# Patient Record
Sex: FEMALE | Race: WHITE | NOT HISPANIC OR LATINO | Employment: OTHER | URBAN - METROPOLITAN AREA
[De-identification: names, ages, dates, MRNs, and addresses within clinical notes are randomized per-mention and may not be internally consistent; named-entity substitution may affect disease eponyms.]

---

## 2019-05-30 ENCOUNTER — OFFICE VISIT (OUTPATIENT)
Dept: URGENT CARE | Facility: PHYSICIAN GROUP | Age: 64
End: 2019-05-30
Payer: COMMERCIAL

## 2019-05-30 VITALS
TEMPERATURE: 97.8 F | HEART RATE: 80 BPM | RESPIRATION RATE: 15 BRPM | BODY MASS INDEX: 20.49 KG/M2 | SYSTOLIC BLOOD PRESSURE: 118 MMHG | HEIGHT: 64 IN | OXYGEN SATURATION: 96 % | WEIGHT: 120 LBS | DIASTOLIC BLOOD PRESSURE: 72 MMHG

## 2019-05-30 DIAGNOSIS — R19.4 BOWEL HABIT CHANGES: ICD-10-CM

## 2019-05-30 DIAGNOSIS — R10.84 GENERALIZED ABDOMINAL PAIN: ICD-10-CM

## 2019-05-30 PROCEDURE — 99203 OFFICE O/P NEW LOW 30 MIN: CPT | Performed by: PHYSICIAN ASSISTANT

## 2019-05-30 RX ORDER — ESCITALOPRAM OXALATE 20 MG/1
10 TABLET ORAL DAILY
COMMUNITY

## 2019-05-31 ENCOUNTER — HOSPITAL ENCOUNTER (EMERGENCY)
Facility: MEDICAL CENTER | Age: 64
End: 2019-05-31
Attending: EMERGENCY MEDICINE
Payer: COMMERCIAL

## 2019-05-31 VITALS
HEIGHT: 64 IN | RESPIRATION RATE: 16 BRPM | WEIGHT: 118.17 LBS | TEMPERATURE: 98.6 F | BODY MASS INDEX: 20.17 KG/M2 | SYSTOLIC BLOOD PRESSURE: 134 MMHG | HEART RATE: 88 BPM | OXYGEN SATURATION: 94 % | DIASTOLIC BLOOD PRESSURE: 96 MMHG

## 2019-05-31 DIAGNOSIS — R19.7 NAUSEA VOMITING AND DIARRHEA: ICD-10-CM

## 2019-05-31 DIAGNOSIS — A04.72 CLOSTRIDIUM DIFFICILE DIARRHEA: ICD-10-CM

## 2019-05-31 DIAGNOSIS — R25.2 MUSCLE CRAMPS: ICD-10-CM

## 2019-05-31 DIAGNOSIS — E86.0 DEHYDRATION: ICD-10-CM

## 2019-05-31 DIAGNOSIS — R11.2 NAUSEA VOMITING AND DIARRHEA: ICD-10-CM

## 2019-05-31 LAB
ANION GAP SERPL CALC-SCNC: 13 MMOL/L (ref 0–11.9)
APPEARANCE UR: CLEAR
BACTERIA #/AREA URNS HPF: NEGATIVE /HPF
BASOPHILS # BLD AUTO: 0.3 % (ref 0–1.8)
BASOPHILS # BLD: 0.05 K/UL (ref 0–0.12)
BILIRUB UR QL STRIP.AUTO: NEGATIVE
BUN SERPL-MCNC: 13 MG/DL (ref 8–22)
C DIFF DNA SPEC QL NAA+PROBE: NEGATIVE
C DIFF TOX A+B STL QL IA: POSITIVE
C DIFF TOX GENS STL QL NAA+PROBE: NORMAL
CALCIUM SERPL-MCNC: 9.4 MG/DL (ref 8.5–10.5)
CHLORIDE SERPL-SCNC: 101 MMOL/L (ref 96–112)
CO2 SERPL-SCNC: 23 MMOL/L (ref 20–33)
COLOR UR: YELLOW
CREAT SERPL-MCNC: 0.66 MG/DL (ref 0.5–1.4)
EOSINOPHIL # BLD AUTO: 0.02 K/UL (ref 0–0.51)
EOSINOPHIL NFR BLD: 0.1 % (ref 0–6.9)
EPI CELLS #/AREA URNS HPF: NEGATIVE /HPF
ERYTHROCYTE [DISTWIDTH] IN BLOOD BY AUTOMATED COUNT: 40.4 FL (ref 35.9–50)
GLUCOSE SERPL-MCNC: 122 MG/DL (ref 65–99)
GLUCOSE UR STRIP.AUTO-MCNC: NEGATIVE MG/DL
HCT VFR BLD AUTO: 46.7 % (ref 37–47)
HGB BLD-MCNC: 15.5 G/DL (ref 12–16)
HYALINE CASTS #/AREA URNS LPF: ABNORMAL /LPF
IMM GRANULOCYTES # BLD AUTO: 0.06 K/UL (ref 0–0.11)
IMM GRANULOCYTES NFR BLD AUTO: 0.3 % (ref 0–0.9)
KETONES UR STRIP.AUTO-MCNC: 15 MG/DL
LEUKOCYTE ESTERASE UR QL STRIP.AUTO: NEGATIVE
LYMPHOCYTES # BLD AUTO: 1.48 K/UL (ref 1–4.8)
LYMPHOCYTES NFR BLD: 8.1 % (ref 22–41)
MCH RBC QN AUTO: 26.5 PG (ref 27–33)
MCHC RBC AUTO-ENTMCNC: 33.2 G/DL (ref 33.6–35)
MCV RBC AUTO: 79.8 FL (ref 81.4–97.8)
MICRO URNS: ABNORMAL
MONOCYTES # BLD AUTO: 1.05 K/UL (ref 0–0.85)
MONOCYTES NFR BLD AUTO: 5.7 % (ref 0–13.4)
NEUTROPHILS # BLD AUTO: 15.61 K/UL (ref 2–7.15)
NEUTROPHILS NFR BLD: 85.5 % (ref 44–72)
NITRITE UR QL STRIP.AUTO: NEGATIVE
NRBC # BLD AUTO: 0 K/UL
NRBC BLD-RTO: 0 /100 WBC
PH UR STRIP.AUTO: 6 [PH]
PLATELET # BLD AUTO: 260 K/UL (ref 164–446)
PMV BLD AUTO: 9.9 FL (ref 9–12.9)
POTASSIUM SERPL-SCNC: 3.6 MMOL/L (ref 3.6–5.5)
PROT UR QL STRIP: NEGATIVE MG/DL
RBC # BLD AUTO: 5.85 M/UL (ref 4.2–5.4)
RBC # URNS HPF: ABNORMAL /HPF
RBC UR QL AUTO: ABNORMAL
SODIUM SERPL-SCNC: 137 MMOL/L (ref 135–145)
SP GR UR STRIP.AUTO: 1.01
UROBILINOGEN UR STRIP.AUTO-MCNC: 0.2 MG/DL
WBC # BLD AUTO: 18.3 K/UL (ref 4.8–10.8)
WBC #/AREA URNS HPF: ABNORMAL /HPF
WBC STL QL MICRO: ABNORMAL

## 2019-05-31 PROCEDURE — 700111 HCHG RX REV CODE 636 W/ 250 OVERRIDE (IP): Performed by: EMERGENCY MEDICINE

## 2019-05-31 PROCEDURE — 87324 CLOSTRIDIUM AG IA: CPT

## 2019-05-31 PROCEDURE — 89055 LEUKOCYTE ASSESSMENT FECAL: CPT

## 2019-05-31 PROCEDURE — 81001 URINALYSIS AUTO W/SCOPE: CPT

## 2019-05-31 PROCEDURE — 80048 BASIC METABOLIC PNL TOTAL CA: CPT

## 2019-05-31 PROCEDURE — 99285 EMERGENCY DEPT VISIT HI MDM: CPT

## 2019-05-31 PROCEDURE — 96372 THER/PROPH/DIAG INJ SC/IM: CPT

## 2019-05-31 PROCEDURE — 96374 THER/PROPH/DIAG INJ IV PUSH: CPT

## 2019-05-31 PROCEDURE — 87899 AGENT NOS ASSAY W/OPTIC: CPT

## 2019-05-31 PROCEDURE — 85025 COMPLETE CBC W/AUTO DIFF WBC: CPT

## 2019-05-31 PROCEDURE — 87045 FECES CULTURE AEROBIC BACT: CPT

## 2019-05-31 PROCEDURE — 700105 HCHG RX REV CODE 258: Performed by: EMERGENCY MEDICINE

## 2019-05-31 PROCEDURE — 87493 C DIFF AMPLIFIED PROBE: CPT

## 2019-05-31 PROCEDURE — 700102 HCHG RX REV CODE 250 W/ 637 OVERRIDE(OP): Performed by: EMERGENCY MEDICINE

## 2019-05-31 PROCEDURE — 87046 STOOL CULTR AEROBIC BACT EA: CPT

## 2019-05-31 PROCEDURE — A9270 NON-COVERED ITEM OR SERVICE: HCPCS | Performed by: EMERGENCY MEDICINE

## 2019-05-31 RX ORDER — ONDANSETRON 2 MG/ML
4 INJECTION INTRAMUSCULAR; INTRAVENOUS ONCE
Status: COMPLETED | OUTPATIENT
Start: 2019-05-31 | End: 2019-05-31

## 2019-05-31 RX ORDER — DICYCLOMINE HCL 20 MG
20 TABLET ORAL EVERY 6 HOURS
Qty: 12 TAB | Refills: 0 | Status: SHIPPED | OUTPATIENT
Start: 2019-05-31

## 2019-05-31 RX ORDER — ONDANSETRON 4 MG/1
4 TABLET, ORALLY DISINTEGRATING ORAL EVERY 6 HOURS PRN
Qty: 10 TAB | Refills: 0 | Status: SHIPPED | OUTPATIENT
Start: 2019-05-31

## 2019-05-31 RX ORDER — SODIUM CHLORIDE 9 MG/ML
1000 INJECTION, SOLUTION INTRAVENOUS ONCE
Status: COMPLETED | OUTPATIENT
Start: 2019-05-31 | End: 2019-05-31

## 2019-05-31 RX ORDER — SODIUM CHLORIDE 9 MG/ML
2000 INJECTION, SOLUTION INTRAVENOUS ONCE
Status: COMPLETED | OUTPATIENT
Start: 2019-05-31 | End: 2019-05-31

## 2019-05-31 RX ORDER — ALENDRONATE SODIUM 70 MG/1
70 TABLET ORAL
COMMUNITY

## 2019-05-31 RX ORDER — AMOXICILLIN AND CLAVULANATE POTASSIUM 875; 125 MG/1; MG/1
1 TABLET, FILM COATED ORAL 2 TIMES DAILY
COMMUNITY
Start: 2019-05-13

## 2019-05-31 RX ORDER — DICYCLOMINE HYDROCHLORIDE 10 MG/ML
20 INJECTION INTRAMUSCULAR ONCE
Status: COMPLETED | OUTPATIENT
Start: 2019-05-31 | End: 2019-05-31

## 2019-05-31 RX ADMIN — SODIUM CHLORIDE 1000 ML: 9 INJECTION, SOLUTION INTRAVENOUS at 21:03

## 2019-05-31 RX ADMIN — SODIUM CHLORIDE 2000 ML: 9 INJECTION, SOLUTION INTRAVENOUS at 16:33

## 2019-05-31 RX ADMIN — DICYCLOMINE HYDROCHLORIDE 20 MG: 20 INJECTION, SOLUTION INTRAMUSCULAR at 16:33

## 2019-05-31 RX ADMIN — ONDANSETRON 4 MG: 2 INJECTION INTRAMUSCULAR; INTRAVENOUS at 16:33

## 2019-05-31 RX ADMIN — VANCOMYCIN HYDROCHLORIDE 250 MG: 10 INJECTION, POWDER, LYOPHILIZED, FOR SOLUTION INTRAVENOUS at 21:52

## 2019-05-31 ASSESSMENT — ENCOUNTER SYMPTOMS
MYALGIAS: 0
FEVER: 0
EYE REDNESS: 0
TINGLING: 0
ABDOMINAL PAIN: 1
ROS GI COMMENTS: 1
EYE DISCHARGE: 0
COUGH: 0
CHANGE IN BOWEL HABIT: 1
HEARTBURN: 0
CONSTIPATION: 0
CHILLS: 1
DIARRHEA: 1
VOMITING: 1
BLOOD IN STOOL: 0
WHEEZING: 0
HEADACHES: 0
NAUSEA: 1
DIZZINESS: 0
FATIGUE: 1
SORE THROAT: 0
NECK PAIN: 0

## 2019-05-31 NOTE — ED NOTES
"Pt brought back from [facility], ambulatory with steady gait.     Pt c/o n/v/d x 3 days. Pt states she had root canal approx. 2 weeks ago. Pt placed on Augmentin, states \"I had diarrhea the whole time I took the antibiotics.\" pt states after course of abx was done stool was more soft rather than watery. States last three days she has experienced diarrhea along with vomiting. States \"I cannot hold anything down.\"     Pt denies fevers. States lower abdominal cramping prior to needing to have BM. A/o x4, speaking in full sentences.   "

## 2019-05-31 NOTE — ED PROVIDER NOTES
ED Provider Note     Scribed for Nayana Bridges D.O. by Chloe Felton. 5/31/2019, 4:18 PM.     Primary care provider: Pcp Pt States None  Means of arrival: walk in          History obtained from: patient   History limited by: none     CHIEF COMPLAINT  Chief Complaint   Patient presents with   • Nausea/Vomiting/Diarrhea       \[facility] [place] [facility]\""  Dimple Olivo is a 63 y.o. female who presents to the emergency Department with nausea and vomiting which began 3 days ago. Patient reports associated diarrhea. Patient just completed a 10-day course Augmentin twice daily from 05/13/19-05/22/19 secondary to undergoing a root canal. Patient reports her symptoms began shortly after finishing the antibiotics. Her symptoms are exacerbated with eating and drinking. No other acute medical complaints or concerns.     REVIEW OF SYSTEMS  Pertinent positives include nausea, vomiting, diarrhea.   See HPI for further details. All other systems are negative.    PAST MEDICAL HISTORY  History reviewed. No pertinent past medical history.    FAMILY HISTORY  History reviewed. No pertinent family history.    SOCIAL HISTORY  Social History   Substance Use Topics   • Smoking status: Never Smoker   • Smokeless tobacco: Never Used   • Alcohol use None noted      History   Drug use: Unknown       SURGICAL HISTORY  History reviewed. No pertinent surgical history.    CURRENT MEDICATIONS  No current facility-administered medications for this encounter.     Current Outpatient Prescriptions:   •  alendronate (FOSAMAX) 70 MG Tab, Take 70 mg by mouth every Sunday., Disp: , Rfl:   •  amoxicillin-clavulanate (AUGMENTIN) 875-125 MG Tab, Take 1 Tab by mouth 2 times a day. 10-day course Starting 05/13/19 Ending 05/22/19., Disp: , Rfl:   •  escitalopram (LEXAPRO) 20 MG tablet, Take 10 mg by mouth every day., Disp: , Rfl:     ALLERGIES  Allergies   Allergen Reactions   • Keflex    • Keflin [Cephalothin]        PHYSICAL EXAM  VITAL SIGNS: /96   Pulse 100   Temp  "37 °C (98.6 °F) (Temporal)   Resp 18   Ht 1.626 m (5' 4\")   Wt 53.6 kg (118 lb 2.7 oz)   SpO2 97%   BMI 20.28 kg/m²     Constitutional: Patient is well developed, well nourished. Non-toxic appearing. Mild distress.   HENT: Normocephalic, atraumatic. Nose normal with no  drainage. Oropharynx dry without erythema or exudates. Dry mucous membranes.  Eyes: PERRL, EOMI, Conjunctiva without erythema or exudates.   Neck: Supple. Normal range of motion in flexion, extension and lateral rotation. No tenderness along the bony prominences or paraspinal muscles.  Lymphatic: No lymphadenopathy noted.   Cardiovascular: Tachycardic and Regular rhythm. No murmur.  Thorax & Lungs: Clear and equal breath sounds with good excursion. No respiratory distress, no rhonchi, wheezing or rales.  Abdomen: Bowel sounds normal in all four quadrants. Soft, mild generalized abdominal tenderness, no rebound , guarding, palpable masses.   Skin: Warm, Dry, No rashes. Increased skin turgor.   Back: No cervical, thoracic, or lumbosacral tenderness.  Extremities: Peripheral pulses 4/4 No edema, increased calf cramping.  Neurologic: Alert & oriented x 3, Normal motor function, Normal sensory function,  DTR's 4/4 bilaterally.  Psychiatric: Affect normal, Judgment normal, Mood normal.     DIAGNOSTICS/PROCEDURES    LABS  Results for orders placed or performed during the hospital encounter of 05/31/19   CBC WITH DIFFERENTIAL   Result Value Ref Range    WBC 18.3 (H) 4.8 - 10.8 K/uL    RBC 5.85 (H) 4.20 - 5.40 M/uL    Hemoglobin 15.5 12.0 - 16.0 g/dL    Hematocrit 46.7 37.0 - 47.0 %    MCV 79.8 (L) 81.4 - 97.8 fL    MCH 26.5 (L) 27.0 - 33.0 pg    MCHC 33.2 (L) 33.6 - 35.0 g/dL    RDW 40.4 35.9 - 50.0 fL    Platelet Count 260 164 - 446 K/uL    MPV 9.9 9.0 - 12.9 fL    Neutrophils-Polys 85.50 (H) 44.00 - 72.00 %    Lymphocytes 8.10 (L) 22.00 - 41.00 %    Monocytes 5.70 0.00 - 13.40 %    Eosinophils 0.10 0.00 - 6.90 %    Basophils 0.30 0.00 - 1.80 %    " Immature Granulocytes 0.30 0.00 - 0.90 %    Nucleated RBC 0.00 /100 WBC    Neutrophils (Absolute) 15.61 (H) 2.00 - 7.15 K/uL    Lymphs (Absolute) 1.48 1.00 - 4.80 K/uL    Monos (Absolute) 1.05 (H) 0.00 - 0.85 K/uL    Eos (Absolute) 0.02 0.00 - 0.51 K/uL    Baso (Absolute) 0.05 0.00 - 0.12 K/uL    Immature Granulocytes (abs) 0.06 0.00 - 0.11 K/uL    NRBC (Absolute) 0.00 K/uL   BASIC METABOLIC PANEL   Result Value Ref Range    Sodium 137 135 - 145 mmol/L    Potassium 3.6 3.6 - 5.5 mmol/L    Chloride 101 96 - 112 mmol/L    Co2 23 20 - 33 mmol/L    Glucose 122 (H) 65 - 99 mg/dL    Bun 13 8 - 22 mg/dL    Creatinine 0.66 0.50 - 1.40 mg/dL    Calcium 9.4 8.5 - 10.5 mg/dL    Anion Gap 13.0 (H) 0.0 - 11.9   URINALYSIS (UA)   Result Value Ref Range    Color Yellow     Character Clear     Specific Gravity 1.009 <1.035    Ph 6.0 5.0 - 8.0    Glucose Negative Negative mg/dL    Ketones 15 (A) Negative mg/dL    Protein Negative Negative mg/dL    Bilirubin Negative Negative    Urobilinogen, Urine 0.2 Negative    Nitrite Negative Negative    Leukocyte Esterase Negative Negative    Occult Blood Small (A) Negative    Micro Urine Req Microscopic    STOOL WBC'S   Result Value Ref Range    Stool WBC's Many (A) None seen   ESTIMATED GFR   Result Value Ref Range    GFR If African American >60 >60 mL/min/1.73 m 2    GFR If Non African American >60 >60 mL/min/1.73 m 2   URINE MICROSCOPIC (W/UA)   Result Value Ref Range    WBC 0-2 /hpf    RBC 2-5 (A) /hpf    Bacteria Negative None /hpf    Epithelial Cells Negative /hpf    Hyaline Cast 0-2 /lpf     Labs reviewed by me    COURSE & MEDICAL DECISION MAKING  Pertinent Labs & Imaging studies reviewed. (See chart for details)    4:18 PM - Patient seen and evaluated at bedside. Ordered for C Diff by PCR, stool WBCs, cultures stool, CBC, BMP, urinalysis to evaluate. Patient will be treated with Zofran 4 mg, Bentyl 20 mg for her symptoms. The patient will be resuscitated with 1L NS IV for signs of  clinical dehydration. Differential diagnoses include, but are not limited to, C Diff, viral gastroenteritis, colitis.     6:47 PM- Patient was reevaluated at bedside. She is feeling much better. There was a good response to IV fluids. Will PO challenge the patient.     8:30 PM- Reviewed the patient's lab results which are shown above. Her WBC was 18.3 and her stool was positive for C Diff. She will be treated with Vancomycin 250 mg.    8:51 PM- Patient rechecked at bedside. The patient was updated on diagnostic test results as seen above. The patient reports her legs are cramping and she will be given another L of NS IV. Once she receives the Vancomycin and NS IV, she will be stable for discharge. The patient will be discharged, status improved, with instructions regarding supportive care and with a prescription for Vancomycin. Instructions were given for follow-up. Discussed indications for seeking immediate medical attention. Patient was given the opportunity for questions. The patient understands and agrees.         HYDRATION: Based on the patient's presentation of Dehydration the patient was given IV fluids. IV Hydration was used because oral hydration was not adequate alone. Upon recheck following hydration, the patient was improved.    The patient will return for new or worsening symptoms and is stable at the time of discharge.    The patient is referred to a primary physician for blood pressure management, diabetic screening, and for all other preventative health concerns.    DISPOSITION:  Patient will be discharged home in stable condition.    FOLLOW UP:  No follow-up provider specified.    OUTPATIENT MEDICATIONS:  New Prescriptions    No medications on file     FINAL IMPRESSION  C. difficile diarrhea  Nausea with vomiting  Generalized abdominal cramping     I, Chloe Felton (Luis A), am scribing for, and in the presence of, Nayana Bridges D.O..    Electronically signed by: Chloe Mtz),  5/31/2019    INayana D.O. personally performed the services described in this documentation, as scribed by Chloe Felton in my presence, and it is both accurate and complete.     The note accurately reflects work and decisions made by me.  Nayana Bridges  5/31/2019  9:59 PM

## 2019-05-31 NOTE — ED NOTES
Med Rec Updated and Complete per Pt at bedside with permission to do so in front of family/visitor  Allergies Reviewed    Pt reports completing a 10-day course Augmentin 875-125mg twice daily from 05/13/19 Ending 05/22/19.

## 2019-05-31 NOTE — ED TRIAGE NOTES
Pt to triage , pt states she had a root canal, was put on antibx  For 10 days, finished the antibx last Thursday , had developed diarrhea at the time, now c/o severe vomiting , and still some diarrhea

## 2019-05-31 NOTE — PROGRESS NOTES
Subjective:      Dimple Olivo is a 63 y.o. female who presents with GI Problem (x 4 days stomach pains, mucus in stool )          Patient is a pleasant 63-year-old female who presents to urgent care with intermittent loose stools since recently on Augmentin.  She completed the antibiotic on the 22nd as she was undergoing a dental procedure.  She does report since stopping the antibiotic she is developed crampy abdominal pain with nausea and vomiting taking 2 days ago.  She denies any vomiting today although does report recent nausea this morning.  She does report that she is tolerating fluids without difficulty at this time.  Of note she became more concerned as she feels the last few days she is been only passing mucus and has been having worsening abdominal pain.  Of note she denies hx of diverticulitis, however does feel similar episode last year. She also denies hx of C. Diff.     GI Problem   This is a new problem. Episode onset: 4-5 days ago. The problem occurs constantly. The problem has been waxing and waning. Associated symptoms include abdominal pain, a change in bowel habit, chills, fatigue, nausea and vomiting. Pertinent negatives include no congestion, coughing, fever, headaches, myalgias, neck pain, rash, sore throat or urinary symptoms. The symptoms are aggravated by eating and drinking. She has tried nothing for the symptoms.       Review of Systems   Constitutional: Positive for chills, fatigue and malaise/fatigue. Negative for fever.   HENT: Negative for congestion, ear discharge and sore throat.    Eyes: Negative for discharge and redness.   Respiratory: Negative for cough and wheezing.    Gastrointestinal: Positive for abdominal pain, change in bowel habit, diarrhea, nausea and vomiting. Negative for blood in stool, constipation, heartburn and melena.        1   Genitourinary: Negative for dysuria and urgency.   Musculoskeletal: Negative for myalgias and neck pain.   Skin: Negative for  "itching and rash.   Neurological: Negative for dizziness, tingling and headaches.          Objective:     /72   Pulse 80   Temp 36.6 °C (97.8 °F) (Temporal)   Resp 15   Ht 1.626 m (5' 4\")   Wt 54.4 kg (120 lb)   SpO2 96%   BMI 20.60 kg/m²    PMH:  has no past medical history on file.  MEDS:   Current Outpatient Prescriptions:   •  escitalopram (LEXAPRO) 20 MG tablet, Take 10 mg by mouth every day., Disp: , Rfl:   •  alendronate (FOSAMAX) 70 MG Tab, Take 70 mg by mouth every Sunday., Disp: , Rfl:   •  amoxicillin-clavulanate (AUGMENTIN) 875-125 MG Tab, Take 1 Tab by mouth 2 times a day. 10-day course Starting 05/13/19 Ending 05/22/19., Disp: , Rfl:   ALLERGIES:   Allergies   Allergen Reactions   • Keflex    • Keflin [Cephalothin]      SURGHX: No past surgical history on file.  SOCHX:  reports that she has never smoked. She has never used smokeless tobacco.  FH: Family history was reviewed, no pertinent findings to report    Physical Exam   Constitutional: She is oriented to person, place, and time. She appears well-developed and well-nourished.   HENT:   Head: Normocephalic and atraumatic.   Right Ear: External ear normal.   Left Ear: External ear normal.   Nose: Nose normal.   Mouth/Throat: Oropharynx is clear and moist. No oropharyngeal exudate.   Eyes: Pupils are equal, round, and reactive to light. EOM are normal.   Neck: Normal range of motion. Neck supple.   Cardiovascular: Normal rate.    No murmur heard.  Pulmonary/Chest: Effort normal and breath sounds normal. No respiratory distress.   Abdominal: Soft. She exhibits no mass. There is no tenderness. There is no rebound and no guarding. No hernia.   Mild tenderness diffusely.   Hyperactive bowel sounds. Neg. Heel tap.    Lymphadenopathy:     She has no cervical adenopathy.   Neurological: She is alert and oriented to person, place, and time.   Skin: Skin is warm. No rash noted. No pallor.   Good skin turgor.      Psychiatric: She has a normal mood " and affect. Her behavior is normal.   Vitals reviewed.              Assessment/Plan:     1. Generalized abdominal pain  - CT-ABDOMEN-PELVIS W/O; Future    2. Bowel habit changes    Pt. With notable pain the last few days without a significantly bowel movement in 3-4 days. Labs at this time of evening are no longer available. However will attempt to have imaging along with stool studies as patient was recently on Augmentin last week for dental concern.   Pt. Is tolerating Po fluids at this time.   Start BRAT diet and continue with fluids- she denies any anti-emetic meds in the clinic today.   Patient given precautionary s/sx that mandate immediate follow up and evaluation in the ED. Advised of risks of not doing so.    DDX, Supportive care, and indications for immediate follow-up discussed with patient.    Instructed to return to clinic or nearest emergency department if we are not available for any change in condition, further concerns, or worsening of symptoms.    The patient demonstrated a good understanding and agreed with the treatment plan.  Please note that this dictation was created using voice recognition software. I have made every reasonable attempt to correct obvious errors, but I expect that there are errors of grammar and possibly content that I did not discover before finalizing the note.    Pt. Is agreeable to have imaging conducted tomorrow on 5/31.   5/  Recheck on patient as she had not scheduling for imaging- pt. Currently having evaluation in the ED.

## 2019-06-01 LAB
E COLI SXT1+2 STL IA: NORMAL
SIGNIFICANT IND 70042: NORMAL
SITE SITE: NORMAL
SOURCE SOURCE: NORMAL

## 2019-06-01 RX ORDER — VANCOMYCIN HYDROCHLORIDE 125 MG/1
125 CAPSULE ORAL 4 TIMES DAILY
Qty: 40 CAP | Refills: 0 | Status: SHIPPED | OUTPATIENT
Start: 2019-06-01 | End: 2019-06-11

## 2019-06-01 NOTE — ED NOTES
Pt discharged home. Explained discharge instructions. Questions and comments addressed. Pt verbalized understanding of instructions. Wristband removed. Pt advised to follow-up with PCP or return to ED for any new or worsening of symptoms. Pt is ambulating well and steady on feet. VS stable.     PIV removed and dressing applied  Pt verbalized understanding of all 3 medication instructions. + probiotic instructions and contact precautions

## 2019-06-01 NOTE — ED NOTES
"Ed from [facility] contacted for lab results, states results \"should be done here in a minute or two\"  "

## 2019-06-01 NOTE — DISCHARGE PLANNING
Discussed with Dr Hurley and new Rx written (Vanco caps) and faxed to [facility] in [place] ([phone]) with confirmation.     Rx scanned to media tab.

## 2019-06-01 NOTE — DISCHARGE INSTRUCTIONS
Increase clear liquids for the next 24 hours and advance as tolerated with the brat diet (bananas, rice, applesauce, toast)  Rest as much as possible  Drink Gatorade for electrolytes  Take the vancomycin until completely gone  Try taking acidophilus and plain white yogurt as well  Follow-up with your primary care doctor within 1 week for recheck and return if any problems or worsening.

## 2019-06-01 NOTE — ED NOTES
"ED Positive Culture Follow-up/Notification Note:    Date: 6/1/19     Patient seen in the ED on 5/31/2019 for nausea/vomiting/diarrhea.   1. Clostridium difficile diarrhea    2. Nausea vomiting and diarrhea    3. Dehydration    4. Muscle cramps       Patient received vancomycin 250mg oral soln once before discharge.  Discharge Medication List as of 5/31/2019  9:57 PM      START taking these medications    Details   Vancomycin HCl (VANCOMYCIN 50 MG/ML) 50 mg/mL Solution Take 2.5 mL by mouth every 6 hours., Disp-100 Quantity Sufficient, R-0, Print Rx Paper      ondansetron (ZOFRAN ODT) 4 MG TABLET DISPERSIBLE Take 1 Tab by mouth every 6 hours as needed., Disp-10 Tab, R-0, Print Rx Paper      dicyclomine (BENTYL) 20 MG Tab Take 1 Tab by mouth every 6 hours., Disp-12 Tab, R-0, Print Rx Paper             Allergies: Keflex and Keflin [cephalothin]     Vitals:    05/31/19 1900 05/31/19 2000 05/31/19 2101 05/31/19 2130   BP:       Pulse: 84  88 88   Resp: 18 20 16 16   Temp:       TempSrc:       SpO2: 96%  95% 94%   Weight:       Height:           Final cultures:   Results     Procedure Component Value Units Date/Time    C Diff Toxin [[id number]]  (Abnormal) Collected:  05/31/19 1741    Order Status:  Completed Updated:  05/31/19 2220     C.Diff Toxin A&B Positive (A)     Comment: TOXIN POSITIVE  Toxin detected by EIA; C. difficile detected by PCR.  If clinically correlated, treatment indicated per guidelines.  Test of cure is not recommended.         Narrative:       ER tel.  05/31/2019, 22:20, Called [phone].  Does this patient have risk factors for C-diff?->Yes  C-Diff Risk Factors->antibiotic exposure  Has patient taken stool softeners or laxatives in the last 5  days?->No  Indication for CDiff by PCR?->Greater than/equal to 3 stools  in 24 hr & \"takes shape of container\"    C Diff by PCR rflx Toxin [[id number]] Collected:  05/31/19 1741    Order Status:  Completed Specimen:  Urine from Stool Updated:  05/31/19 2217     C " "Diff by PCR See Toxin     027-NAP1-BI Presumptive Negative     Comment: Presumptive 027/NAP1/BI target DNA sequences are NOT DETECTED.       Narrative:       Does this patient have risk factors for C-diff?->Yes  C-Diff Risk Factors->antibiotic exposure  Has patient taken stool softeners or laxatives in the last 5  days?->No  Indication for CDiff by PCR?->Greater than/equal to 3 stools  in 24 hr & \"takes shape of container\"    URINALYSIS (UA) [[id number]]  (Abnormal) Collected:  05/31/19 1741    Order Status:  Completed Specimen:  Urine Updated:  05/31/19 1809     Color Yellow     Character Clear     Specific Gravity 1.009     Ph 6.0     Glucose Negative mg/dL      Ketones 15 (A) mg/dL      Protein Negative mg/dL      Bilirubin Negative     Urobilinogen, Urine 0.2     Nitrite Negative     Leukocyte Esterase Negative     Occult Blood Small (A)     Micro Urine Req Microscopic    CULTURE STOOL [[id number]] Collected:  05/31/19 1741    Order Status:  Completed Specimen:  Urine from Stool Updated:  05/31/19 1748          Plan:   Appropriate antibiotic therapy prescribed. No changes required based upon culture result. I called and discussed the positive culture result and encouraged compliance with prescribed antibiotics along with strict return precautions for worsening (e.g. Fever, increased stooling, increased abdominal pain). Patient stated she had to have her prescription changed to oral capsules and would be picking them up in [place]. She was feeling better, but her stomach has started to hurt a little. She will get the capsules from [place] and acknowledge compliance and the return precautions. She said she received excellent care here and thanked me for my call. She also took my number in case she had any problems obtaining her antibiotics.      Juliet Frank, PharmD    "

## 2019-06-01 NOTE — DISCHARGE PLANNING
Pt called stating she cannot get her Vancomycin Liquid filled in [place] and would like another Rx. Will d/w ERP and call pt back.

## 2019-06-03 LAB
BACTERIA STL CULT: NORMAL
E COLI SXT1+2 STL IA: NORMAL
SIGNIFICANT IND 70042: NORMAL
SITE SITE: NORMAL
SOURCE SOURCE: NORMAL

## 2019-06-16 ENCOUNTER — HOSPITAL ENCOUNTER (EMERGENCY)
Facility: MEDICAL CENTER | Age: 64
End: 2019-06-16
Attending: EMERGENCY MEDICINE
Payer: COMMERCIAL

## 2019-06-16 VITALS
OXYGEN SATURATION: 96 % | DIASTOLIC BLOOD PRESSURE: 88 MMHG | BODY MASS INDEX: 19.68 KG/M2 | HEART RATE: 86 BPM | HEIGHT: 64 IN | RESPIRATION RATE: 16 BRPM | SYSTOLIC BLOOD PRESSURE: 122 MMHG | WEIGHT: 115.3 LBS | TEMPERATURE: 97 F

## 2019-06-16 DIAGNOSIS — R11.2 NAUSEA AND VOMITING, INTRACTABILITY OF VOMITING NOT SPECIFIED, UNSPECIFIED VOMITING TYPE: ICD-10-CM

## 2019-06-16 DIAGNOSIS — R10.84 GENERALIZED ABDOMINAL PAIN: ICD-10-CM

## 2019-06-16 DIAGNOSIS — A04.72 C. DIFFICILE DIARRHEA: ICD-10-CM

## 2019-06-16 LAB
ALBUMIN SERPL BCP-MCNC: 4.8 G/DL (ref 3.2–4.9)
ALBUMIN/GLOB SERPL: 1.5 G/DL
ALP SERPL-CCNC: 86 U/L (ref 30–99)
ALT SERPL-CCNC: 16 U/L (ref 2–50)
ANION GAP SERPL CALC-SCNC: 11 MMOL/L (ref 0–11.9)
APPEARANCE UR: CLEAR
AST SERPL-CCNC: 14 U/L (ref 12–45)
BACTERIA #/AREA URNS HPF: NEGATIVE /HPF
BASOPHILS # BLD AUTO: 0.2 % (ref 0–1.8)
BASOPHILS # BLD: 0.04 K/UL (ref 0–0.12)
BILIRUB SERPL-MCNC: 0.7 MG/DL (ref 0.1–1.5)
BILIRUB UR QL STRIP.AUTO: NEGATIVE
BUN SERPL-MCNC: 18 MG/DL (ref 8–22)
CALCIUM SERPL-MCNC: 9.7 MG/DL (ref 8.5–10.5)
CHLORIDE SERPL-SCNC: 99 MMOL/L (ref 96–112)
CO2 SERPL-SCNC: 27 MMOL/L (ref 20–33)
COLOR UR: YELLOW
CREAT SERPL-MCNC: 0.75 MG/DL (ref 0.5–1.4)
EOSINOPHIL # BLD AUTO: 0.01 K/UL (ref 0–0.51)
EOSINOPHIL NFR BLD: 0.1 % (ref 0–6.9)
EPI CELLS #/AREA URNS HPF: NEGATIVE /HPF
ERYTHROCYTE [DISTWIDTH] IN BLOOD BY AUTOMATED COUNT: 40.9 FL (ref 35.9–50)
GLOBULIN SER CALC-MCNC: 3.3 G/DL (ref 1.9–3.5)
GLUCOSE SERPL-MCNC: 122 MG/DL (ref 65–99)
GLUCOSE UR STRIP.AUTO-MCNC: NEGATIVE MG/DL
HCT VFR BLD AUTO: 48.2 % (ref 37–47)
HGB BLD-MCNC: 14.9 G/DL (ref 12–16)
HYALINE CASTS #/AREA URNS LPF: ABNORMAL /LPF
IMM GRANULOCYTES # BLD AUTO: 0.06 K/UL (ref 0–0.11)
IMM GRANULOCYTES NFR BLD AUTO: 0.4 % (ref 0–0.9)
KETONES UR STRIP.AUTO-MCNC: 15 MG/DL
LEUKOCYTE ESTERASE UR QL STRIP.AUTO: ABNORMAL
LIPASE SERPL-CCNC: 8 U/L (ref 11–82)
LYMPHOCYTES # BLD AUTO: 2.82 K/UL (ref 1–4.8)
LYMPHOCYTES NFR BLD: 17.2 % (ref 22–41)
MCH RBC QN AUTO: 25.1 PG (ref 27–33)
MCHC RBC AUTO-ENTMCNC: 30.9 G/DL (ref 33.6–35)
MCV RBC AUTO: 81.1 FL (ref 81.4–97.8)
MICRO URNS: ABNORMAL
MONOCYTES # BLD AUTO: 1.22 K/UL (ref 0–0.85)
MONOCYTES NFR BLD AUTO: 7.4 % (ref 0–13.4)
NEUTROPHILS # BLD AUTO: 12.23 K/UL (ref 2–7.15)
NEUTROPHILS NFR BLD: 74.7 % (ref 44–72)
NITRITE UR QL STRIP.AUTO: NEGATIVE
NRBC # BLD AUTO: 0 K/UL
NRBC BLD-RTO: 0 /100 WBC
PH UR STRIP.AUTO: 7 [PH]
PLATELET # BLD AUTO: 295 K/UL (ref 164–446)
PMV BLD AUTO: 10 FL (ref 9–12.9)
POTASSIUM SERPL-SCNC: 4 MMOL/L (ref 3.6–5.5)
PROT SERPL-MCNC: 8.1 G/DL (ref 6–8.2)
PROT UR QL STRIP: NEGATIVE MG/DL
RBC # BLD AUTO: 5.94 M/UL (ref 4.2–5.4)
RBC # URNS HPF: ABNORMAL /HPF
RBC UR QL AUTO: ABNORMAL
SODIUM SERPL-SCNC: 137 MMOL/L (ref 135–145)
SP GR UR STRIP.AUTO: 1.01
UROBILINOGEN UR STRIP.AUTO-MCNC: 0.2 MG/DL
WBC # BLD AUTO: 16.4 K/UL (ref 4.8–10.8)
WBC #/AREA URNS HPF: ABNORMAL /HPF

## 2019-06-16 PROCEDURE — 85025 COMPLETE CBC W/AUTO DIFF WBC: CPT

## 2019-06-16 PROCEDURE — 700111 HCHG RX REV CODE 636 W/ 250 OVERRIDE (IP): Performed by: EMERGENCY MEDICINE

## 2019-06-16 PROCEDURE — 80053 COMPREHEN METABOLIC PANEL: CPT

## 2019-06-16 PROCEDURE — 36415 COLL VENOUS BLD VENIPUNCTURE: CPT

## 2019-06-16 PROCEDURE — 99284 EMERGENCY DEPT VISIT MOD MDM: CPT

## 2019-06-16 PROCEDURE — 81001 URINALYSIS AUTO W/SCOPE: CPT

## 2019-06-16 PROCEDURE — 83690 ASSAY OF LIPASE: CPT

## 2019-06-16 PROCEDURE — 96374 THER/PROPH/DIAG INJ IV PUSH: CPT

## 2019-06-16 PROCEDURE — 700105 HCHG RX REV CODE 258: Performed by: EMERGENCY MEDICINE

## 2019-06-16 RX ORDER — ONDANSETRON 2 MG/ML
4 INJECTION INTRAMUSCULAR; INTRAVENOUS ONCE
Status: COMPLETED | OUTPATIENT
Start: 2019-06-16 | End: 2019-06-16

## 2019-06-16 RX ORDER — ONDANSETRON 4 MG/1
4 TABLET, ORALLY DISINTEGRATING ORAL EVERY 8 HOURS PRN
Qty: 20 TAB | Refills: 0 | Status: SHIPPED | OUTPATIENT
Start: 2019-06-16

## 2019-06-16 RX ORDER — SODIUM CHLORIDE 9 MG/ML
1000 INJECTION, SOLUTION INTRAVENOUS ONCE
Status: COMPLETED | OUTPATIENT
Start: 2019-06-16 | End: 2019-06-16

## 2019-06-16 RX ORDER — METOCLOPRAMIDE 10 MG/1
10 TABLET ORAL 4 TIMES DAILY PRN
Qty: 20 TAB | Refills: 0 | Status: SHIPPED
Start: 2019-06-16

## 2019-06-16 RX ORDER — VANCOMYCIN HYDROCHLORIDE 250 MG/1
250 CAPSULE ORAL 4 TIMES DAILY
Qty: 14 CAP | Refills: 0 | Status: SHIPPED | OUTPATIENT
Start: 2019-06-16 | End: 2019-06-23

## 2019-06-16 RX ADMIN — SODIUM CHLORIDE 1000 ML: 9 INJECTION, SOLUTION INTRAVENOUS at 15:13

## 2019-06-16 RX ADMIN — ONDANSETRON 4 MG: 2 INJECTION INTRAMUSCULAR; INTRAVENOUS at 15:13

## 2019-06-16 ASSESSMENT — ENCOUNTER SYMPTOMS
ABDOMINAL PAIN: 1
NAUSEA: 1
DIARRHEA: 1
VOMITING: 1
FEVER: 0

## 2019-06-16 ASSESSMENT — LIFESTYLE VARIABLES: DO YOU DRINK ALCOHOL: NO

## 2019-06-16 NOTE — ED PROVIDER NOTES
ED Provider Note    Scribed for Jasson Sun M.D. by Jose Luu. 6/16/2019, 2:43 PM.    Primary care provider: Pcp Pt States None  Means of arrival: Walk-in  History obtained from: Patient  History limited by: None    CHIEF COMPLAINT  Chief Complaint   Patient presents with   • N/V       HPI  Dimple Olivo is a 63 y.o. female who presents to the Emergency Department complaining of constant, worsening nausea and vomiting onset 3 days ago. She additionally endorses associated abdominal pain and decreased PO tolerance, but denies any fever. Her last BM was 2 days ago and was diarrhea. The patient states that she was seen here 2 weeks ago and diagnosed with C. Diff. She states that she was treated with vancomycin and her symptoms subsided before reappearing 3 days ago, the day after completing her medication regime. Additionally, the patient notes that although she was prescribed vancomycin in liquid form, she took the medication in solid form. The patient notes that she was seen at [facility] 2 days ago and was re-prescribed her C. diff medication and instructions to come to the ED to be evaluated if her symptoms started to worsen. She states that she took her previous C. Diff medication and Zofran but they have provided no alleviation.     REVIEW OF SYSTEMS  Review of Systems   Constitutional: Negative for fever.   Gastrointestinal: Positive for abdominal pain, diarrhea, nausea and vomiting.        Positive for decreased PO tolerance.        PAST MEDICAL HISTORY   has a past medical history of C. difficile diarrhea.    SURGICAL HISTORY  patient denies any surgical history    SOCIAL HISTORY  Social History   Substance Use Topics   • Smoking status: Never Smoker   • Smokeless tobacco: Never Used   • Alcohol use Yes      Comment: occ      History   Drug Use No       FAMILY HISTORY  History reviewed. No pertinent family history.    CURRENT MEDICATIONS  Home Medications    **Home medications have  "not yet been reviewed for this encounter**         ALLERGIES  Allergies   Allergen Reactions   • Keflex    • Keflin [Cephalothin]        PHYSICAL EXAM  VITAL SIGNS: /82   Pulse 99   Temp 36.6 °C (97.9 °F) (Temporal)   Resp 12   Ht 1.626 m (5' 4\")   Wt 52.3 kg (115 lb 4.8 oz)   SpO2 95%   BMI 19.79 kg/m²     Constitutional:  No acute distress  HENT: Dry mucous membranes  Eyes: No conjunctivitis or icterus  Neck: trachea is midline, no palpable thyroid  Lymphatic: No cervical lymphadenopathy  Cardiovascular: Regular rate and rhythm, no murmurs  Thorax & Lungs: Normal breath sounds, no rhonchi  Abdomen: Soft, Non-tender  Skin:. no rash  Back: Non-tender, no CVA tenderness  Extremities:  no edema  Vascular: symmetric radial pulse  Neurologic: Normal gross motor    LABS  Labs Reviewed   CBC WITH DIFFERENTIAL - Abnormal; Notable for the following:        Result Value    WBC 16.4 (*)     RBC 5.94 (*)     Hematocrit 48.2 (*)     MCV 81.1 (*)     MCH 25.1 (*)     MCHC 30.9 (*)     Neutrophils-Polys 74.70 (*)     Lymphocytes 17.20 (*)     Neutrophils (Absolute) 12.23 (*)     Monos (Absolute) 1.22 (*)     All other components within normal limits   COMP METABOLIC PANEL - Abnormal; Notable for the following:     Glucose 122 (*)     All other components within normal limits   LIPASE - Abnormal; Notable for the following:     Lipase 8 (*)     All other components within normal limits   URINALYSIS,CULTURE IF INDICATED - Abnormal; Notable for the following:     Ketones 15 (*)     Leukocyte Esterase Trace (*)     Occult Blood Small (*)     All other components within normal limits   URINE MICROSCOPIC (W/UA) - Abnormal; Notable for the following:     RBC 2-5 (*)     All other components within normal limits   ESTIMATED GFR   CDIFF BY PCR RFLX TOXIN     All labs reviewed by me.    COURSE & MEDICAL DECISION MAKING  Pertinent Labs & Imaging studies reviewed. (See chart for details)    2:43 PM - Patient seen and examined at " bedside. Patient will be treated with Zofran 4mg/ml. Ordered Cdiff by PCR, CBC with diff, CMP, lipase, and urinalysis to evaluate her symptoms. The differential diagnoses include but are not limited to: C. Diff and electrolyte imbalance.      3:42 PM - Patient was reevaluated at bedside. The patient reports feeling improved and is no longer nausea. Discussed lab results with the patient and informed them that their white count was consistent with C. Diff, but I recommended for further testing to confirm.     4:52 PM - Patient was reevaluated at bedside. Discussed lab results with the patient and informed them that I will be prescribing them Reglan and Zofran. I informed them that the GI doctor would like them to take vancomycin for 21 day, and so I will prescribe her an additional 7 days of vancomycin. I recommended that she follow-up with the GI doctor and I outlined my plan to discharge them at this time. The patient is understanding and agreeable to discharge.    The patient will return for new or worsening symptoms and is stable at the time of discharge.    DISPOSITION:  Patient will be discharged home in stable condition.    FOLLOW UP:  [facility]  [address] [place] [id number] [phone]          OUTPATIENT MEDICATIONS:  New Prescriptions    No medications on file       FINAL IMPRESSION  1. Nausea and vomiting, intractability of vomiting not specified, unspecified vomiting type    2. Generalized abdominal pain    3. C. difficile diarrhea         Jasson GARCIA M.D. personally performed the services described in this documentation, as scribed by Jose Luu in my presence, and it is both accurate and complete.    C.    The note accurately reflects work and decisions made by me.  Jasson Sun  6/16/2019  7:49 PM

## 2019-06-16 NOTE — ED TRIAGE NOTES
"Chief Complaint   Patient presents with   • N/V     Pt ambulatory to triage with above complaint.  Pt states she was seen at this facility two weeks ago.  Pt states she was on antibiotics and began to have abdominal pain and N/V/D and came to hospital and was dx with c diff. Pt states she was given more antibiotics.  Pt states last night she began to have severe abdominal pain and nausea.     Pt returned to [facility], educated on triage process, and to inform staff of any changes or concerns.      /82   Pulse 99   Temp 36.6 °C (97.9 °F) (Temporal)   Resp 12   Ht 1.626 m (5' 4\")   Wt 52.3 kg (115 lb 4.8 oz)   SpO2 95%     "